# Patient Record
Sex: FEMALE | Race: OTHER | Employment: FULL TIME | URBAN - METROPOLITAN AREA
[De-identification: names, ages, dates, MRNs, and addresses within clinical notes are randomized per-mention and may not be internally consistent; named-entity substitution may affect disease eponyms.]

---

## 2023-01-19 ENCOUNTER — HOSPITAL ENCOUNTER (EMERGENCY)
Age: 28
Discharge: HOME | End: 2023-01-19
Attending: EMERGENCY MEDICINE
Payer: COMMERCIAL

## 2023-01-19 ENCOUNTER — APPOINTMENT (OUTPATIENT)
Dept: GENERAL RADIOLOGY | Age: 28
End: 2023-01-19
Attending: EMERGENCY MEDICINE
Payer: COMMERCIAL

## 2023-01-19 VITALS
OXYGEN SATURATION: 97 % | HEIGHT: 65 IN | SYSTOLIC BLOOD PRESSURE: 117 MMHG | WEIGHT: 180 LBS | DIASTOLIC BLOOD PRESSURE: 64 MMHG | TEMPERATURE: 98.6 F | HEART RATE: 83 BPM | RESPIRATION RATE: 16 BRPM | BODY MASS INDEX: 29.99 KG/M2

## 2023-01-19 DIAGNOSIS — J20.9 ACUTE BRONCHITIS, UNSPECIFIED ORGANISM: Primary | ICD-10-CM

## 2023-01-19 DIAGNOSIS — J01.90 ACUTE SINUSITIS, RECURRENCE NOT SPECIFIED, UNSPECIFIED LOCATION: ICD-10-CM

## 2023-01-19 LAB
APPEARANCE UR: CLEAR
BACTERIA URNS QL MICRO: ABNORMAL /HPF
BILIRUB UR QL: NEGATIVE
COLOR UR: YELLOW
EPITH CASTS URNS QL MICRO: ABNORMAL /LPF (ref 0–5)
FLUAV RNA SPEC QL NAA+PROBE: NOT DETECTED
FLUBV RNA SPEC QL NAA+PROBE: NOT DETECTED
GLUCOSE UR STRIP.AUTO-MCNC: NEGATIVE MG/DL
HCG UR QL: NEGATIVE
HGB UR QL STRIP: ABNORMAL
KETONES UR QL STRIP.AUTO: NEGATIVE MG/DL
LEUKOCYTE ESTERASE UR QL STRIP.AUTO: NEGATIVE
NITRITE UR QL STRIP.AUTO: NEGATIVE
PH UR STRIP: 5.5 (ref 5–8)
PROT UR STRIP-MCNC: NEGATIVE MG/DL
RBC #/AREA URNS HPF: ABNORMAL /HPF (ref 0–5)
SARS-COV-2, COV2: NOT DETECTED
SP GR UR REFRACTOMETRY: 1.01 (ref 1–1.03)
UROBILINOGEN UR QL STRIP.AUTO: 1 EU/DL (ref 0.2–1)
WBC URNS QL MICRO: ABNORMAL /HPF (ref 0–5)

## 2023-01-19 PROCEDURE — 71046 X-RAY EXAM CHEST 2 VIEWS: CPT

## 2023-01-19 PROCEDURE — 81025 URINE PREGNANCY TEST: CPT

## 2023-01-19 PROCEDURE — 99284 EMERGENCY DEPT VISIT MOD MDM: CPT

## 2023-01-19 PROCEDURE — 87636 SARSCOV2 & INF A&B AMP PRB: CPT

## 2023-01-19 PROCEDURE — 81001 URINALYSIS AUTO W/SCOPE: CPT

## 2023-01-19 RX ORDER — AZITHROMYCIN 250 MG/1
TABLET, FILM COATED ORAL
Qty: 6 TABLET | Refills: 0 | Status: SHIPPED | OUTPATIENT
Start: 2023-01-19 | End: 2023-01-24

## 2023-01-19 RX ORDER — BENZONATATE 100 MG/1
100 CAPSULE ORAL
Qty: 30 CAPSULE | Refills: 0 | Status: SHIPPED | OUTPATIENT
Start: 2023-01-19 | End: 2023-01-26

## 2023-01-19 NOTE — ED NOTES
Discharge instructions reviewed with pt who verbalized understanding of all instructions. Pt ambulatory out of department with steady gait.

## 2023-01-19 NOTE — Clinical Note
[facility] [place]  [facility] EMERGENCY DEPT  [address]  [facility] NEWS [facility] [id number] [phone]    Work/School Note    Date: 1/19/2023    To Whom It May concern:    Jose Alejandro Cabrera was seen and treated today in the emergency room by the following provider(s):  Attending Provider: Fernanda Neville MD.      Jose Alejandro Cabrera is excused from work/school on 01/19/23 and 01/20/23. She is medically clear to return to work/school on 1/21/2023.        Sincerely,          Carlos Lucia MD

## 2023-01-19 NOTE — ED TRIAGE NOTES
Pt CC of coughing for 3 weeks, sore throat, complains of blood streaks in sputum. Time of onset: 3 weeks     Treatment PTA: Tylenol 0300      Pt ambulated independently  A&Ox4  Speaking in full sentences clearly  Respirations even and unlabored  Skin pink/warm/dry/intact  Patient behavior: Sitting comfortably on cot, no distress noted.

## 2023-01-19 NOTE — Clinical Note
[facility] [place]  [facility]  [address]  [facility] [id number] [phone]    Work/School Note    Date: 1/19/2023    To Whom It May concern:    Nicky May was seen and treated today in the emergency room by the following provider(s):  Attending Provider: George Rodriguez MD.      Nicky May is excused from work/school on 01/19/23 and 01/20/23. She is medically clear to return to work/school on 1/21/2023.        Sincerely,          Teresa Gayle RN

## 2023-01-19 NOTE — Clinical Note
[facility] [place]  [facility] EMERGENCY DEPT  [address] [facility] NEWS [address]  [id number] [phone]    Work/School Note    Date: 1/19/2023    To Whom It May concern:    Nomi Suarez was seen and treated today in the emergency room by the following provider(s):  Attending Provider: Navin Pino MD.      Nomi Suarez is excused from work/school on 01/19/23 and 01/20/23. She is medically clear to return to work/school on 1/21/2023.        Sincerely,          Jeny Pretty MD

## 2023-01-23 NOTE — ED PROVIDER NOTES
EMERGENCY DEPARTMENT HISTORY AND PHYSICAL EXAM      Date: 1/19/2023  Patient Name: Trevor Paulino    History of Presenting Illness     Chief Complaint   Patient presents with    Cough       History Provided By: Patient    HPI: Trevor Paulino, 32 y.o. female with no pertinent past medical history presents to the emergency department chief complaint of cough and congestion. Patient states she been having some symptoms now for 3 weeks, had been improving however over the last several days symptoms seem to have worsened. She also notes some specks of blood in her sputum. Otherwise denies any recent long immobilization or lower extremity pain/swelling. Pt denies any other alleviating or exacerbating factors. Additionally, pt specifically denies any recent fever, chills, headache, vision changes    PCP: None    Current Outpatient Medications   Medication Sig Dispense Refill    benzonatate (Tessalon Perles) 100 mg capsule Take 1 Capsule by mouth three (3) times daily as needed for Cough for up to 7 days. 30 Capsule 0    azithromycin (Zithromax Z-Claudio) 250 mg tablet 1 pack 6 Tablet 0       Past History     Past Medical History:  History reviewed, no pertinent past medical history    Past Surgical History:  No past surgical history on file. Family History:  No family history on file. Social History:  Denies current heavy alcohol or illicit drug use    Allergies:  No Known Allergies      Review of Systems   Review of Systems  Pertinent positives and negatives in HPI    Physical Exam   Physical Exam    GENERAL: alert and oriented, no acute distress  EYES: PEERL, No injection, discharge or icterus. ENT: Mucous membranes pink and moist.  NECK: Supple  LUNGS: Airway patent. Non-labored respirations. Breath sounds clear with good air entry bilaterally. HEART: Regular rate and rhythm. No peripheral edema  ABDOMEN: Non-distended and non-tender, without guarding or rebound.   SKIN:  warm, dry  MSK/EXTREMITIES: Without swelling, tenderness or deformity, symmetric with normal ROM  NEUROLOGICAL: Alert, oriented      Diagnostic Study Results     Labs -   No results found for this or any previous visit (from the past 12 hour(s)). Radiologic Studies -   XR CHEST PA LAT   Final Result   No acute process. CT Results  (Last 48 hours)      None          CXR Results  (Last 48 hours)      None              Medical Decision Making       I reviewed the vital signs, available nursing notes, past medical history, past surgical history, family history and social history. Vital Signs-Reviewed the patient's vital signs. Provider Notes (Medical Decision Making): On presentation, the patient is well appearing, in no acute distress with normal vital signs. Based on my history and exam the differential diagnosis for this patient includes bronchitis, pneumonia, COVID, pulmonary embolism, hemorrhage. Chest x-ray showed no acute abnormalities on my interpretation. COVID and flu testing were negative. Patient is having some bloody sputum so I considered pulmonary embolism however her symptoms are more suggestive of an infectious etiology and was advised to be low risk per Wells criteria so would not obtain a CTA or D-dimer at this time. ED Course:   Initial assessment performed. The patients presenting problems have been discussed, and they are in agreement with the care plan formulated and outlined with them. I have encouraged them to ask questions as they arise throughout their visit. Patient was given the following medications:  Medications - No data to display         Disposition Considerations (Tests not done, Shared Decision Making, Pt Expectation of Test or Tx.): Considered D-dimer and CT angiogram however her symptoms would not warrant this at this time. PROGRESS  Wilfredo Bean's  results have been reviewed with her. She has been counseled regarding her diagnosis.   She verbally conveys understanding and agreement of the signs, symptoms, diagnosis, treatment and prognosis and additionally agrees to follow up as recommended. She also agrees with the care-plan and conveys that all of her questions have been answered. I have also put together some discharge instructions for her that include: 1) educational information regarding their diagnosis, 2) how to care for their diagnosis at home, as well a 3) list of reasons why they would want to return to the ED prior to their follow-up appointment, should their condition change. Disposition:  home    PLAN:  1. Discharge Medication List as of 1/19/2023  4:55 AM        2. Follow-up Information       Follow up With Specialties Details Why [address]    [facility]  If symptoms worsen [address] [place] [id number] [phone]          Return to ED if worse     Diagnosis     Clinical Impression:   1. Acute bronchitis, unspecified organism    2. Acute sinusitis, recurrence not specified, unspecified location          IChrista MD am the first provider for this patient and am the attending of record for this patient encounter. Christa Duran MD        Please note that this dictation was completed with Dragon, computer voice recognition software. Quite often unanticipated grammatical, syntax, homophones, and other interpretive errors are inadvertently transcribed by the computer software. Please disregard these errors. Additionally, please excuse any errors that have escaped final proofreading.